# Patient Record
Sex: FEMALE | Race: WHITE | NOT HISPANIC OR LATINO | URBAN - NONMETROPOLITAN AREA
[De-identification: names, ages, dates, MRNs, and addresses within clinical notes are randomized per-mention and may not be internally consistent; named-entity substitution may affect disease eponyms.]

---

## 2017-02-01 ENCOUNTER — OFFICE VISIT (OUTPATIENT)
Dept: RETAIL CLINIC | Facility: CLINIC | Age: 27
End: 2017-02-01

## 2017-02-01 VITALS
TEMPERATURE: 99.2 F | SYSTOLIC BLOOD PRESSURE: 110 MMHG | OXYGEN SATURATION: 97 % | WEIGHT: 143 LBS | HEART RATE: 67 BPM | RESPIRATION RATE: 18 BRPM | DIASTOLIC BLOOD PRESSURE: 60 MMHG

## 2017-02-01 DIAGNOSIS — R30.0 DYSURIA: Primary | ICD-10-CM

## 2017-02-01 LAB
BILIRUB BLD-MCNC: NEGATIVE MG/DL
CLARITY, POC: CLEAR
COLOR UR: YELLOW
GLUCOSE UR STRIP-MCNC: NEGATIVE MG/DL
KETONES UR QL: NEGATIVE
LEUKOCYTE EST, POC: ABNORMAL
NITRITE UR-MCNC: NEGATIVE MG/ML
PH UR: 5 [PH] (ref 5–8)
PROT UR STRIP-MCNC: NEGATIVE MG/DL
RBC # UR STRIP: NEGATIVE /UL
SP GR UR: 1.02 (ref 1–1.03)
UROBILINOGEN UR QL: NORMAL

## 2017-02-01 PROCEDURE — 81003 URINALYSIS AUTO W/O SCOPE: CPT | Performed by: NURSE PRACTITIONER

## 2017-02-01 PROCEDURE — 99203 OFFICE O/P NEW LOW 30 MIN: CPT | Performed by: NURSE PRACTITIONER

## 2017-02-01 RX ORDER — SULFAMETHOXAZOLE AND TRIMETHOPRIM 800; 160 MG/1; MG/1
1 TABLET ORAL 2 TIMES DAILY
Qty: 10 TABLET | Refills: 0 | Status: SHIPPED | OUTPATIENT
Start: 2017-02-01

## 2017-02-01 RX ORDER — PHENAZOPYRIDINE HYDROCHLORIDE 100 MG/1
100 TABLET, FILM COATED ORAL 3 TIMES DAILY PRN
Qty: 10 TABLET | Refills: 0 | Status: SHIPPED | OUTPATIENT
Start: 2017-02-01

## 2017-02-01 NOTE — PATIENT INSTRUCTIONS
Dysuria  Dysuria is pain or discomfort while urinating. The pain or discomfort may be felt in the tube that carries urine out of the bladder (urethra) or in the surrounding tissue of the genitals. The pain may also be felt in the groin area, lower abdomen, and lower back. You may have to urinate frequently or have the sudden feeling that you have to urinate (urgency). Dysuria can affect both men and women, but is more common in women.  Dysuria can be caused by many different things, including:  · Urinary tract infection in women.  · Infection of the kidney or bladder.  · Kidney stones or bladder stones.  · Certain sexually transmitted infections (STIs), such as chlamydia.  · Dehydration.  · Inflammation of the vagina.  · Use of certain medicines.  · Use of certain soaps or scented products that cause irritation.  HOME CARE INSTRUCTIONS  Watch your dysuria for any changes. The following actions may help to reduce any discomfort you are feeling:  · Drink enough fluid to keep your urine clear or pale yellow.  · Empty your bladder often. Avoid holding urine for long periods of time.  · After a bowel movement or urination, women should cleanse from front to back, using each tissue only once.  · Empty your bladder after sexual intercourse.  · Take medicines only as directed by your health care provider.  · If you were prescribed an antibiotic medicine, finish it all even if you start to feel better.  · Avoid caffeine, tea, and alcohol. They can irritate the bladder and make dysuria worse. In men, alcohol may irritate the prostate.  · Keep all follow-up visits as directed by your health care provider. This is important.  · If you had any tests done to find the cause of dysuria, it is your responsibility to obtain your test results. Ask the lab or department performing the test when and how you will get your results. Talk with your health care provider if you have any questions about your results.  SEEK MEDICAL CARE  IF:  · You develop pain in your back or sides.  · You have a fever.  · You have nausea or vomiting.  · You have blood in your urine.  · You are not urinating as often as you usually do.  SEEK IMMEDIATE MEDICAL CARE IF:  · You pain is severe and not relieved with medicines.  · You are unable to hold down any fluids.  · You or someone else notices a change in your mental function.  · You have a rapid heartbeat at rest.  · You have shaking or chills.  · You feel extremely weak.     This information is not intended to replace advice given to you by your health care provider. Make sure you discuss any questions you have with your health care provider.     Document Released: 09/15/2005 Document Revised: 01/08/2016 Document Reviewed: 08/13/2015  [employer] Patient Education ©2016 [employer]      WIll treat for UTI based on symptoms, however, dip today is not impressive.  Sent for culture.  If negative, would discontinue antibiotics.  If symptoms persist, you should follow up with your PCP.

## 2017-02-01 NOTE — PROGRESS NOTES
Subjective   Kathleen Melo is a 26 y.o. female.   No chief complaint on file.     HPI Comments: Symptoms persistent.  No hematuria.  Has not taken anything.  Has never had UTI.  Currently on menstrual cycle.      Urinary Tract Infection    This is a new problem. The current episode started 1 to 4 weeks ago (approx 10 days). The problem occurs every urination. The problem has been unchanged. There has been no fever. She is sexually active. There is no history of pyelonephritis. Associated symptoms include frequency and urgency. Pertinent negatives include no chills, flank pain, hematuria, nausea or vomiting. She has tried nothing for the symptoms. The treatment provided no relief. There is no history of kidney stones, recurrent UTIs or a single kidney.        The following portions of the patient's history were reviewed and updated as appropriate: allergies, current medications, past family history, past medical history, past social history, past surgical history and problem list.    Current Outpatient Prescriptions:   •  norgestrel-ethinyl estradiol (CRYSELLE-28) 0.3-30 MG-MCG per tablet, Take 1 tablet by mouth Daily., Disp: , Rfl:   •  sulfamethoxazole-trimethoprim (BACTRIM DS) 800-160 MG per tablet, Take 1 tablet by mouth 2 (Two) Times a Day., Disp: 10 tablet, Rfl: 0    Review of Systems   Constitutional: Positive for fatigue. Negative for appetite change, chills and fever.   Gastrointestinal: Positive for abdominal pain (Mild suprapubic pain). Negative for diarrhea, nausea and vomiting.   Genitourinary: Positive for dysuria, frequency and urgency. Negative for decreased urine volume, flank pain, hematuria, menstrual problem (currently on cycle), vaginal discharge and vaginal pain.   Musculoskeletal: Negative for back pain.   Skin: Negative for rash.     Visit Vitals   • /60 (BP Location: Left arm, Patient Position: Sitting, Cuff Size: Adult)   • Pulse 67   • Temp 99.2 °F (37.3 °C) (Oral)   • Resp 18   • Wt  143 lb (64.9 kg)   • LMP 02/01/2017   • SpO2 97%       Objective   Allergies no known allergies    Physical Exam   Constitutional: She appears well-developed and well-nourished. She does not appear ill. No distress.   HENT:   Head: Normocephalic and atraumatic.   Mouth/Throat: Oropharynx is clear and moist and mucous membranes are normal.   Cardiovascular: Normal rate and regular rhythm.    Pulmonary/Chest: Breath sounds normal. No respiratory distress.   Abdominal: Soft. Normal appearance. There is tenderness (mild) in the suprapubic area. There is no CVA tenderness.   Skin: Skin is warm and dry. No rash noted.       Assessment/Plan   Diagnoses and all orders for this visit:    Dysuria  -     POC Urinalysis Dipstick, Automated  -     Urine Culture    Other orders  -     sulfamethoxazole-trimethoprim (BACTRIM DS) 800-160 MG per tablet; Take 1 tablet by mouth 2 (Two) Times a Day.    WIll treat for UTI based on symptoms, however, dip today is not impressive.  Sent for culture.  If negative, would discontinue antibiotics.  If symptoms persist, you should follow up with your PCP.      Lab Results (last 24 hours)     Procedure Component Value Units Date/Time    POC Urinalysis Dipstick, Automated [[id number]]  (Abnormal) Collected:  02/01/17 1309    Specimen:  Urine Updated:  02/01/17 1310     Color Yellow      Clarity, UA Clear      Glucose, UA Negative mg/dL      Bilirubin Negative      Ketones, UA Negative      Specific Gravity  1.025      Blood, UA Negative      pH, Urine 5.0      Protein, POC Negative mg/dL      Urobilinogen, UA Normal      Leukocytes Small (1+) (A)      Nitrite, UA Negative

## 2017-02-04 ENCOUNTER — DOCUMENTATION (OUTPATIENT)
Dept: RETAIL CLINIC | Facility: CLINIC | Age: 27
End: 2017-02-04

## 2017-02-04 LAB
BACTERIA UR CULT: ABNORMAL
BACTERIA UR CULT: ABNORMAL
OTHER ANTIBIOTIC SUSC ISLT: ABNORMAL

## 2017-02-04 NOTE — PROGRESS NOTES
Urine c&s results show uti with e coli.  Called results to pt  She was placed on bactrim and sensitivity shows   This should be effective.  Pt states she is doing better and was notified of results.  She was instructed to   Complete the bactrim and be seen if any problems.